# Patient Record
Sex: FEMALE | Race: WHITE | NOT HISPANIC OR LATINO | Employment: PART TIME | URBAN - METROPOLITAN AREA
[De-identification: names, ages, dates, MRNs, and addresses within clinical notes are randomized per-mention and may not be internally consistent; named-entity substitution may affect disease eponyms.]

---

## 2020-10-13 ENCOUNTER — OFFICE VISIT (OUTPATIENT)
Dept: URGENT CARE | Facility: CLINIC | Age: 50
End: 2020-10-13
Payer: MEDICARE

## 2020-10-13 VITALS
SYSTOLIC BLOOD PRESSURE: 149 MMHG | BODY MASS INDEX: 23.54 KG/M2 | OXYGEN SATURATION: 100 % | HEART RATE: 93 BPM | DIASTOLIC BLOOD PRESSURE: 75 MMHG | HEIGHT: 67 IN | TEMPERATURE: 97.5 F | WEIGHT: 150 LBS | RESPIRATION RATE: 18 BRPM

## 2020-10-13 DIAGNOSIS — K12.1 ORAL ULCER: Primary | ICD-10-CM

## 2020-10-13 PROCEDURE — G0463 HOSPITAL OUTPT CLINIC VISIT: HCPCS | Performed by: PHYSICIAN ASSISTANT

## 2020-10-13 PROCEDURE — 99203 OFFICE O/P NEW LOW 30 MIN: CPT | Performed by: PHYSICIAN ASSISTANT

## 2020-10-13 RX ORDER — CARBAMAZEPINE 200 MG
TABLET, EXTENDED RELEASE 12 HR ORAL
COMMUNITY
Start: 2020-10-10

## 2020-10-13 RX ORDER — AMLODIPINE BESYLATE 5 MG/1
TABLET ORAL
COMMUNITY
Start: 2020-10-10

## 2020-10-13 RX ORDER — BUSPIRONE HYDROCHLORIDE 10 MG/1
TABLET ORAL
COMMUNITY
Start: 2020-10-10

## 2020-10-13 RX ORDER — LIDOCAINE HYDROCHLORIDE 20 MG/ML
10 SOLUTION OROPHARYNGEAL 4 TIMES DAILY PRN
Qty: 100 ML | Refills: 0 | Status: SHIPPED | OUTPATIENT
Start: 2020-10-13

## 2020-10-13 RX ORDER — CHLORHEXIDINE GLUCONATE 0.12 MG/ML
15 RINSE ORAL 2 TIMES DAILY
Qty: 120 ML | Refills: 0 | Status: SHIPPED | OUTPATIENT
Start: 2020-10-13

## 2020-10-13 RX ORDER — CLOMIPRAMINE HYDROCHLORIDE 50 MG/1
CAPSULE ORAL
COMMUNITY
Start: 2020-10-10

## 2021-12-04 ENCOUNTER — APPOINTMENT (OUTPATIENT)
Dept: RADIOLOGY | Facility: CLINIC | Age: 51
End: 2021-12-04
Payer: MEDICARE

## 2021-12-04 ENCOUNTER — OFFICE VISIT (OUTPATIENT)
Dept: URGENT CARE | Facility: CLINIC | Age: 51
End: 2021-12-04
Payer: MEDICARE

## 2021-12-04 VITALS
RESPIRATION RATE: 18 BRPM | WEIGHT: 150 LBS | TEMPERATURE: 98.1 F | HEART RATE: 100 BPM | BODY MASS INDEX: 22.73 KG/M2 | OXYGEN SATURATION: 99 % | HEIGHT: 68 IN

## 2021-12-04 DIAGNOSIS — R05.9 COUGH: ICD-10-CM

## 2021-12-04 DIAGNOSIS — R05.9 COUGH: Primary | ICD-10-CM

## 2021-12-04 PROCEDURE — G0463 HOSPITAL OUTPT CLINIC VISIT: HCPCS | Performed by: NURSE PRACTITIONER

## 2021-12-04 PROCEDURE — 99213 OFFICE O/P EST LOW 20 MIN: CPT | Performed by: NURSE PRACTITIONER

## 2021-12-04 PROCEDURE — U0003 INFECTIOUS AGENT DETECTION BY NUCLEIC ACID (DNA OR RNA); SEVERE ACUTE RESPIRATORY SYNDROME CORONAVIRUS 2 (SARS-COV-2) (CORONAVIRUS DISEASE [COVID-19]), AMPLIFIED PROBE TECHNIQUE, MAKING USE OF HIGH THROUGHPUT TECHNOLOGIES AS DESCRIBED BY CMS-2020-01-R: HCPCS | Performed by: NURSE PRACTITIONER

## 2021-12-04 PROCEDURE — 71046 X-RAY EXAM CHEST 2 VIEWS: CPT

## 2021-12-04 PROCEDURE — U0005 INFEC AGEN DETEC AMPLI PROBE: HCPCS | Performed by: NURSE PRACTITIONER

## 2021-12-04 RX ORDER — LISINOPRIL 10 MG/1
10 TABLET ORAL DAILY
COMMUNITY

## 2021-12-04 RX ORDER — ALBUTEROL SULFATE 90 UG/1
2 AEROSOL, METERED RESPIRATORY (INHALATION) EVERY 6 HOURS PRN
Qty: 8.5 G | Refills: 0 | Status: SHIPPED | OUTPATIENT
Start: 2021-12-04

## 2021-12-05 LAB — SARS-COV-2 RNA RESP QL NAA+PROBE: NEGATIVE

## 2021-12-06 ENCOUNTER — TELEPHONE (OUTPATIENT)
Dept: URGENT CARE | Facility: CLINIC | Age: 51
End: 2021-12-06

## 2021-12-07 ENCOUNTER — TELEPHONE (OUTPATIENT)
Dept: URGENT CARE | Facility: CLINIC | Age: 51
End: 2021-12-07

## 2023-09-13 ENCOUNTER — OFFICE VISIT (OUTPATIENT)
Dept: URGENT CARE | Facility: CLINIC | Age: 53
End: 2023-09-13
Payer: MEDICARE

## 2023-09-13 VITALS
HEART RATE: 86 BPM | WEIGHT: 150 LBS | OXYGEN SATURATION: 99 % | BODY MASS INDEX: 23.54 KG/M2 | HEIGHT: 67 IN | DIASTOLIC BLOOD PRESSURE: 94 MMHG | SYSTOLIC BLOOD PRESSURE: 140 MMHG | RESPIRATION RATE: 16 BRPM

## 2023-09-13 DIAGNOSIS — M25.562 ACUTE PAIN OF LEFT KNEE: Primary | ICD-10-CM

## 2023-09-13 PROCEDURE — 99213 OFFICE O/P EST LOW 20 MIN: CPT | Performed by: PHYSICIAN ASSISTANT

## 2023-09-13 PROCEDURE — G0463 HOSPITAL OUTPT CLINIC VISIT: HCPCS | Performed by: PHYSICIAN ASSISTANT

## 2023-09-13 RX ORDER — METHYLPREDNISOLONE 4 MG/1
TABLET ORAL
Qty: 21 TABLET | Refills: 0 | Status: SHIPPED | OUTPATIENT
Start: 2023-09-13

## 2023-09-13 RX ORDER — LEVOTHYROXINE SODIUM 0.03 MG/1
TABLET ORAL
COMMUNITY
Start: 2023-07-28

## 2023-09-13 NOTE — PROGRESS NOTES
[facility]      NAME: Erin Juarez is a 46 y.o. female  : 1970    MRN: [id number]  DATE: 2023  TIME: 1:47 PM    Assessment and Plan   Acute pain of left knee [M25.562]  1. Acute pain of left knee  methylPREDNISolone 4 MG tablet therapy pack          Patient Instructions   Medrol pack pack as prescribed. Also suggest consideration of lyme testing from PCP. Patient verbalized understanding and will follow up with pcp in 3-5 days. To present to the ER if symptoms worsen. Chief Complaint     Chief Complaint   Patient presents with   • Knee Pain     Patient c/o left knee that started two weeks ago. No known injury. History of Present Illness   Mague PARKER presents to the clinic c/o    Knee Pain   The incident occurred more than 1 week ago. There was no injury mechanism. The pain is present in the left knee. Quality: sharp. The pain is at a severity of 8/10. The pain is moderate. The pain has been constant since onset. Pertinent negatives include no numbness or tingling. The symptoms are aggravated by movement, weight bearing and palpation. She has tried acetaminophen for the symptoms. The treatment provided mild relief. Review of Systems   Review of Systems   Constitutional: Negative for chills, diaphoresis, fatigue and fever. HENT: Negative for congestion, ear discharge, ear pain and facial swelling. Eyes: Negative for photophobia, pain, discharge, redness, itching and visual disturbance. Respiratory: Negative for apnea, cough, chest tightness, shortness of breath and wheezing. Cardiovascular: Negative for chest pain and palpitations. Gastrointestinal: Negative for abdominal pain. Musculoskeletal: Positive for arthralgias and joint swelling. Skin: Negative for color change, rash and wound. Neurological: Negative for dizziness, tingling, numbness and headaches. Hematological: Negative for adenopathy.          Current Medications     Long-Term Medications   Medication Sig Dispense Refill   • amLODIPine (NORVASC) 5 mg tablet      • busPIRone (BUSPAR) 10 mg tablet      • clomiPRAMINE (ANAFRANIL) 50 mg capsule      • levothyroxine 25 mcg tablet      • lisinopril (ZESTRIL) 10 mg tablet Take 10 mg by mouth daily     • TEGretol- MG 12 hr tablet          Current Allergies     Allergies as of 09/13/2023 - Reviewed 09/13/2023   Allergen Reaction Noted   • Penicillins  10/13/2020            The following portions of the patient's history were reviewed and updated as appropriate: allergies, current medications, past family history, past medical history, past social history, past surgical history and problem list.  Past Medical History:   Diagnosis Date   • Anxiety    • Hypertension    • Pyloric stenosis in pediatric patient    • Seizure ([address])      Past Surgical History:   Procedure Laterality Date   • DILATION AND CURETTAGE, DIAGNOSTIC / THERAPEUTIC     • TONSILLECTOMY     • US GUIDED THYROID BIOPSY  9/29/2022     Social History     Socioeconomic History   • Marital status: Single     Spouse name: Not on file   • Number of children: Not on file   • Years of education: Not on file   • Highest education level: Not on file   Occupational History   • Not on file   Tobacco Use   • Smoking status: Every Day     Packs/day: 1.00     Types: Cigarettes   • Smokeless tobacco: Never   Vaping Use   • Vaping Use: Never used   Substance and Sexual Activity   • Alcohol use: Never   • Drug use: Never   • Sexual activity: Not on file   Other Topics Concern   • Not on file   Social History Narrative   • Not on file     Social Determinants of Health     Financial Resource Strain: Not on file   Food Insecurity: Not on file   Transportation Needs: Not on file   Physical Activity: Not on file   Stress: Not on file   Social Connections: Not on file   Intimate Partner Violence: Not on file   Housing Stability: Not on file       Objective   /94   Pulse 86   Resp 16   Ht 5' 6.5" (1.689 m)   Wt 68 kg (150 lb)   LMP 10/10/2020 (Exact Date)   SpO2 99%   BMI 23.85 kg/m²      Physical Exam     Physical Exam  Vitals and nursing note reviewed. Constitutional:       General: She is not in acute distress. Appearance: She is well-developed. She is not diaphoretic. HENT:      Head: Normocephalic and atraumatic. Right Ear: Tympanic membrane and external ear normal.      Left Ear: Tympanic membrane and external ear normal.      Nose: Nose normal.      Mouth/Throat:      Mouth: Mucous membranes are moist.      Pharynx: No oropharyngeal exudate or posterior oropharyngeal erythema. Eyes:      General: No scleral icterus. Right eye: No discharge. Left eye: No discharge. Conjunctiva/sclera: Conjunctivae normal.   Cardiovascular:      Rate and Rhythm: Normal rate and regular rhythm. Heart sounds: Normal heart sounds. No murmur heard. No friction rub. No gallop. Pulmonary:      Effort: Pulmonary effort is normal. No respiratory distress. Breath sounds: Normal breath sounds. No decreased breath sounds, wheezing, rhonchi or rales. Musculoskeletal:      Left knee: Swelling (mild medial) present. No bony tenderness. Normal range of motion. Tenderness (over pes anserine bursa) present. Skin:     General: Skin is warm and dry. Coloration: Skin is not pale. Findings: No erythema or rash. Neurological:      Mental Status: She is alert and oriented to person, place, and time. Psychiatric:         Behavior: Behavior normal.         Thought Content:  Thought content normal.         Judgment: Judgment normal.         Wandalee Cogan, PA-C

## 2023-12-14 ENCOUNTER — OFFICE VISIT (OUTPATIENT)
Dept: URGENT CARE | Facility: CLINIC | Age: 53
End: 2023-12-14
Payer: MEDICARE

## 2023-12-14 VITALS
BODY MASS INDEX: 23.54 KG/M2 | HEART RATE: 89 BPM | DIASTOLIC BLOOD PRESSURE: 88 MMHG | TEMPERATURE: 97.7 F | WEIGHT: 150 LBS | SYSTOLIC BLOOD PRESSURE: 140 MMHG | RESPIRATION RATE: 18 BRPM | OXYGEN SATURATION: 96 % | HEIGHT: 67 IN

## 2023-12-14 DIAGNOSIS — R50.9 FEVER, UNSPECIFIED FEVER CAUSE: Primary | ICD-10-CM

## 2023-12-14 PROCEDURE — 99213 OFFICE O/P EST LOW 20 MIN: CPT | Performed by: NURSE PRACTITIONER

## 2023-12-14 PROCEDURE — G0463 HOSPITAL OUTPT CLINIC VISIT: HCPCS | Performed by: NURSE PRACTITIONER

## 2023-12-14 PROCEDURE — 87636 SARSCOV2 & INF A&B AMP PRB: CPT | Performed by: NURSE PRACTITIONER

## 2023-12-14 NOTE — PROGRESS NOTES
[facility]        NAME: Jose Boles is a 48 y.o. female  : 1970    MRN: [id number]  DATE: 2023  TIME: 1:12 PM    Assessment and Plan   Fever, unspecified fever cause [R50.9]  1. Fever, unspecified fever cause  COVID/FLU            Patient Instructions     Patient Instructions   Take medication as directed. Rest and drink plenty of fluids. A cool mist humidifier can be helpful. If you develop a worsening cough, chest pain, shortness of breath, palpitations, coughing up blood, prolonged high fever, any new or concerning symptoms please return or proceed ER. Recommend following up with PCP in 3-5 days        Chief Complaint     Chief Complaint   Patient presents with    Cold Like Symptoms     Patient c/o fever, sore throat and cough that started yesterday. History of Present Illness       Cough  This is a new problem. The current episode started yesterday. The problem has been unchanged. The problem occurs hourly. The cough is Non-productive. Associated symptoms include a fever, nasal congestion, rhinorrhea and a sore throat. Pertinent negatives include no chest pain, chills, ear pain, headaches, myalgias, postnasal drip, rash, shortness of breath or wheezing. Nothing aggravates the symptoms. She has tried nothing for the symptoms. There is no history of asthma, COPD or pneumonia. Review of Systems   Review of Systems   Constitutional:  Positive for fever. Negative for chills, diaphoresis and fatigue. HENT:  Positive for congestion, rhinorrhea and sore throat. Negative for ear pain, facial swelling, postnasal drip, sinus pressure, sinus pain, tinnitus and trouble swallowing. Eyes: Negative. Respiratory:  Positive for cough. Negative for chest tightness, shortness of breath, wheezing and stridor. Cardiovascular:  Negative for chest pain and palpitations. Gastrointestinal: Negative.     Musculoskeletal:  Negative for arthralgias, back pain, joint swelling, myalgias, neck pain and neck stiffness. Skin:  Negative for rash. Neurological:  Negative for dizziness, facial asymmetry, weakness, light-headedness, numbness and headaches. Current Medications       Current Outpatient Medications:     amLODIPine (NORVASC) 5 mg tablet, , Disp: , Rfl:     busPIRone (BUSPAR) 10 mg tablet, , Disp: , Rfl:     clomiPRAMINE (ANAFRANIL) 50 mg capsule, , Disp: , Rfl:     levothyroxine 25 mcg tablet, , Disp: , Rfl:     lisinopril (ZESTRIL) 10 mg tablet, Take 10 mg by mouth daily, Disp: , Rfl:     methylPREDNISolone 4 MG tablet therapy pack, Use as directed on package, Disp: 21 tablet, Rfl: 0    TEGretol- MG 12 hr tablet, , Disp: , Rfl:     albuterol (ProAir HFA) 90 mcg/act inhaler, Inhale 2 puffs every 6 (six) hours as needed for shortness of breath (Patient not taking: Reported on 9/13/2023), Disp: 8.5 g, Rfl: 0    chlorhexidine (PERIDEX) 0.12 % solution, Apply 15 mL to the mouth or throat 2 (two) times a day (Patient not taking: Reported on 9/13/2023), Disp: 120 mL, Rfl: 0    Lidocaine Viscous HCl (XYLOCAINE) 2 % mucosal solution, Swish and spit 10 mL 4 (four) times a day as needed for mouth pain or discomfort (Patient not taking: Reported on 12/4/2021), Disp: 100 mL, Rfl: 0    Current Allergies     Allergies as of 12/14/2023 - Reviewed 12/14/2023   Allergen Reaction Noted    Penicillins  10/13/2020            The following portions of the patient's history were reviewed and updated as appropriate: allergies, current medications, past family history, past medical history, past social history, past surgical history and problem list.     Past Medical History:   Diagnosis Date    Anxiety     Hypertension     Pyloric stenosis in pediatric patient     Seizure [facility])        Past Surgical History:   Procedure Laterality Date    DILATION AND CURETTAGE, DIAGNOSTIC / THERAPEUTIC      TONSILLECTOMY      US GUIDED THYROID BIOPSY  9/29/2022       History reviewed.  No pertinent family history. Medications have been verified. Objective   /88   Pulse 89   Temp 97.7 °F (36.5 °C) (Temporal)   Resp 18   Ht 5' 6.5" (1.689 m)   Wt 68 kg (150 lb)   LMP 10/10/2020 (Exact Date)   SpO2 96%   BMI 23.85 kg/m²   Patient's last menstrual period was 10/10/2020 (exact date). Physical Exam     Physical Exam  Constitutional:       General: She is not in acute distress. Appearance: She is well-developed. She is not diaphoretic. HENT:      Head: Normocephalic and atraumatic. Right Ear: Hearing, tympanic membrane, ear canal and external ear normal.      Left Ear: Hearing, tympanic membrane, ear canal and external ear normal.      Nose: Congestion present. No mucosal edema. Right Sinus: No maxillary sinus tenderness or frontal sinus tenderness. Left Sinus: No maxillary sinus tenderness or frontal sinus tenderness. Mouth/Throat:      Mouth: Mucous membranes are moist.      Pharynx: Oropharynx is clear. Uvula midline. Cardiovascular:      Rate and Rhythm: Normal rate and regular rhythm. Heart sounds: Normal heart sounds, S1 normal and S2 normal.   Pulmonary:      Effort: Pulmonary effort is normal.      Breath sounds: Normal breath sounds and air entry. Lymphadenopathy:      Cervical: No cervical adenopathy. Skin:     General: Skin is warm and dry. Neurological:      Mental Status: She is alert and oriented to person, place, and time.

## 2023-12-15 LAB
FLUAV RNA RESP QL NAA+PROBE: NEGATIVE
FLUBV RNA RESP QL NAA+PROBE: NEGATIVE
SARS-COV-2 RNA RESP QL NAA+PROBE: NEGATIVE

## 2024-05-02 ENCOUNTER — APPOINTMENT (OUTPATIENT)
Dept: RADIOLOGY | Facility: CLINIC | Age: 54
End: 2024-05-02
Payer: MEDICARE

## 2024-05-02 ENCOUNTER — OFFICE VISIT (OUTPATIENT)
Dept: URGENT CARE | Facility: CLINIC | Age: 54
End: 2024-05-02
Payer: MEDICARE

## 2024-05-02 VITALS
TEMPERATURE: 98 F | OXYGEN SATURATION: 98 % | HEART RATE: 87 BPM | RESPIRATION RATE: 18 BRPM | WEIGHT: 159 LBS | SYSTOLIC BLOOD PRESSURE: 140 MMHG | BODY MASS INDEX: 25.55 KG/M2 | HEIGHT: 66 IN | DIASTOLIC BLOOD PRESSURE: 70 MMHG

## 2024-05-02 DIAGNOSIS — R07.81 RIB PAIN ON RIGHT SIDE: Primary | ICD-10-CM

## 2024-05-02 DIAGNOSIS — R07.81 RIB PAIN ON RIGHT SIDE: ICD-10-CM

## 2024-05-02 PROCEDURE — 71101 X-RAY EXAM UNILAT RIBS/CHEST: CPT

## 2024-05-02 PROCEDURE — 99213 OFFICE O/P EST LOW 20 MIN: CPT | Performed by: NURSE PRACTITIONER

## 2024-05-02 PROCEDURE — G0463 HOSPITAL OUTPT CLINIC VISIT: HCPCS | Performed by: NURSE PRACTITIONER

## 2024-05-02 RX ORDER — ATORVASTATIN CALCIUM 40 MG/1
40 TABLET, FILM COATED ORAL DAILY
COMMUNITY
Start: 2023-08-25 | End: 2024-08-24

## 2024-05-02 NOTE — PATIENT INSTRUCTIONS
Ice. Use incentive spirometer as directed.. Tylenol as needed for pain. If you develop any increased pain, SOB, chest pain, numbness, tingling, or any new or concerning symptoms please return or proceed to ER. Follow up with pcp in 3-5 days.

## 2024-05-02 NOTE — PROGRESS NOTES
[facility]        NAME: Mague PARKER is a 53 y.o. female  : 1970    MRN: [id number]  DATE: May 2, 2024  TIME: 1:56 PM    Assessment and Plan   Rib pain on right side [R07.81]  1. Rib pain on right side  XR ribs right w pa chest min 3 views        Xrays show no acute osseous abnormality per my read. Awaiting rad read    Patient Instructions       Patient Instructions   Ice. Use incentive spirometer as directed.. Tylenol as needed for pain. If you develop any increased pain, SOB, chest pain, numbness, tingling, or any new or concerning symptoms please return or proceed to ER. Follow up with pcp in 3-5 days.     If tests have been performed at [facility], our office will contact you with results if changes need to be made to the care plan discussed with you at the visit.  You can review your full results on [facility] MyChart.    Chief Complaint     Chief Complaint   Patient presents with    Chest Pain     Last  week she mowed the grass.2 days later she has right rib pain.Today she can not take a deep breath without it hurting the rib area.          History of Present Illness       Chest Pain   This is a new (pain began 2 days ago after push mowing her lawn) problem. Episode onset: 2 days ago. The onset quality is sudden. The problem occurs intermittently. The problem has been unchanged. Pain location: right rib pain. The pain is moderate. Quality: aching. The pain does not radiate. Pertinent negatives include no abdominal pain, back pain, cough, diaphoresis, fever, headaches, leg pain, nausea, numbness, palpitations, shortness of breath, syncope, vomiting or weakness. Associated symptoms comments: Rib pain. The pain is aggravated by nothing. She has tried nothing for the symptoms. There are no known risk factors.       Review of Systems   Review of Systems   Constitutional:  Negative for chills, diaphoresis, fatigue and fever.   Respiratory:  Negative for cough, chest tightness, shortness of breath,  wheezing and stridor.    Cardiovascular:  Positive for chest pain. Negative for palpitations and syncope.   Gastrointestinal:  Negative for abdominal pain, diarrhea, nausea and vomiting.   Musculoskeletal:  Negative for arthralgias, back pain, joint swelling and myalgias.        Rib pain   Skin: Negative.    Neurological:  Negative for weakness, numbness and headaches.         Current Medications       Current Outpatient Medications:     atorvastatin (LIPITOR) 40 mg tablet, Take 40 mg by mouth daily, Disp: , Rfl:     busPIRone (BUSPAR) 10 mg tablet, , Disp: , Rfl:     clomiPRAMINE (ANAFRANIL) 50 mg capsule, , Disp: , Rfl:     levothyroxine 25 mcg tablet, , Disp: , Rfl:     lisinopril (ZESTRIL) 10 mg tablet, Take 10 mg by mouth daily, Disp: , Rfl:     multivitamin-minerals (CENTRUM) tablet, Take by mouth, Disp: , Rfl:     TEGretol- MG 12 hr tablet, , Disp: , Rfl:     albuterol (ProAir HFA) 90 mcg/act inhaler, Inhale 2 puffs every 6 (six) hours as needed for shortness of breath (Patient not taking: Reported on 9/13/2023), Disp: 8.5 g, Rfl: 0    amLODIPine (NORVASC) 5 mg tablet, , Disp: , Rfl:     chlorhexidine (PERIDEX) 0.12 % solution, Apply 15 mL to the mouth or throat 2 (two) times a day (Patient not taking: Reported on 9/13/2023), Disp: 120 mL, Rfl: 0    Lidocaine Viscous HCl (XYLOCAINE) 2 % mucosal solution, Swish and spit 10 mL 4 (four) times a day as needed for mouth pain or discomfort (Patient not taking: Reported on 12/4/2021), Disp: 100 mL, Rfl: 0    methylPREDNISolone 4 MG tablet therapy pack, Use as directed on package (Patient not taking: Reported on 5/2/2024), Disp: 21 tablet, Rfl: 0    Current Allergies     Allergies as of 05/02/2024 - Reviewed 05/02/2024   Allergen Reaction Noted    Penicillins Hives 10/13/2020            The following portions of the patient's history were reviewed and updated as appropriate: allergies, current medications, past family history, past medical history, past social  "history, past surgical history and problem list.     Past Medical History:   Diagnosis Date    Anxiety     Hypertension     Pyloric stenosis in pediatric patient     Seizure ([facility]CC)        Past Surgical History:   Procedure Laterality Date    DILATION AND CURETTAGE, DIAGNOSTIC / THERAPEUTIC      TONSILLECTOMY      US GUIDED THYROID BIOPSY  9/29/2022       No family history on file.      Medications have been verified.        Objective   /70   Pulse 87   Temp 98 °F (36.7 °C)   Resp 18   Ht 5' 6\" (1.676 m)   Wt 72.1 kg (159 lb)   LMP 10/10/2020 (Exact Date)   SpO2 98%   BMI 25.66 kg/m²   Patient's last menstrual period was 10/10/2020 (exact date).       Physical Exam     Physical Exam  Constitutional:       General: She is not in acute distress.     Appearance: Normal appearance. She is well-developed. She is not diaphoretic.   Cardiovascular:      Rate and Rhythm: Normal rate and regular rhythm.      Heart sounds: Normal heart sounds.   Pulmonary:      Effort: Pulmonary effort is normal.      Breath sounds: Normal breath sounds and air entry.   Chest:      Chest wall: Tenderness (TTP over right anterior lower ribs. chest expansion symmetrical) present. No lacerations, swelling or crepitus.   Abdominal:      General: Bowel sounds are normal. There is no distension.      Palpations: Abdomen is soft. Abdomen is not rigid. There is no mass.      Tenderness: There is no abdominal tenderness. There is no guarding or rebound. Negative signs include Gilmore's sign and McBurney's sign.   Skin:     General: Skin is warm and dry.   Neurological:      Mental Status: She is alert and oriented to person, place, and time.                     "

## 2024-05-03 ENCOUNTER — TELEPHONE (OUTPATIENT)
Dept: URGENT CARE | Facility: CLINIC | Age: 54
End: 2024-05-03

## 2024-05-03 NOTE — TELEPHONE ENCOUNTER
Discussed xray results. Advised to continue incentive spirometer. Return precautions discussed. Verbalizes understanding